# Patient Record
Sex: FEMALE | Race: WHITE | NOT HISPANIC OR LATINO | Employment: FULL TIME | URBAN - NONMETROPOLITAN AREA
[De-identification: names, ages, dates, MRNs, and addresses within clinical notes are randomized per-mention and may not be internally consistent; named-entity substitution may affect disease eponyms.]

---

## 2023-10-03 ENCOUNTER — OFFICE VISIT (OUTPATIENT)
Dept: CARDIOLOGY | Facility: CLINIC | Age: 33
End: 2023-10-03
Payer: COMMERCIAL

## 2023-10-03 VITALS
BODY MASS INDEX: 51.91 KG/M2 | DIASTOLIC BLOOD PRESSURE: 70 MMHG | HEART RATE: 74 BPM | WEIGHT: 293 LBS | HEIGHT: 63 IN | SYSTOLIC BLOOD PRESSURE: 126 MMHG

## 2023-10-03 DIAGNOSIS — E66.01 MORBID OBESITY WITH BMI OF 50.0-59.9, ADULT (MULTI): ICD-10-CM

## 2023-10-03 DIAGNOSIS — R00.2 PALPITATIONS: Primary | ICD-10-CM

## 2023-10-03 DIAGNOSIS — Z78.9 NEVER SMOKED TOBACCO: ICD-10-CM

## 2023-10-03 DIAGNOSIS — I10 PRIMARY HYPERTENSION: ICD-10-CM

## 2023-10-03 PROCEDURE — 3078F DIAST BP <80 MM HG: CPT | Performed by: INTERNAL MEDICINE

## 2023-10-03 PROCEDURE — 3074F SYST BP LT 130 MM HG: CPT | Performed by: INTERNAL MEDICINE

## 2023-10-03 PROCEDURE — 1036F TOBACCO NON-USER: CPT | Performed by: INTERNAL MEDICINE

## 2023-10-03 PROCEDURE — 93000 ELECTROCARDIOGRAM COMPLETE: CPT | Performed by: INTERNAL MEDICINE

## 2023-10-03 PROCEDURE — 99204 OFFICE O/P NEW MOD 45 MIN: CPT | Performed by: INTERNAL MEDICINE

## 2023-10-03 RX ORDER — LEVONORGESTREL 52 MG/1
INTRAUTERINE DEVICE INTRAUTERINE
COMMUNITY

## 2023-10-03 RX ORDER — BUPROPION HYDROCHLORIDE 150 MG/1
150 TABLET ORAL EVERY MORNING
COMMUNITY

## 2023-10-03 RX ORDER — EPINEPHRINE 0.1 MG/ML
INJECTION INTRAVENOUS ONCE
COMMUNITY

## 2023-10-03 ASSESSMENT — PATIENT HEALTH QUESTIONNAIRE - PHQ9
SUM OF ALL RESPONSES TO PHQ9 QUESTIONS 1 AND 2: 0
1. LITTLE INTEREST OR PLEASURE IN DOING THINGS: NOT AT ALL
2. FEELING DOWN, DEPRESSED OR HOPELESS: NOT AT ALL

## 2023-10-03 NOTE — PROGRESS NOTES
"Subjective   Tiffany Perez is a 33 y.o. female.    Chief Complaint:  Palpitations (New Patient-referred for heart palps)    Patient is seen for the above.  She describes intermittent sometimes infrequent episodes of palpitation typically at rest.  Differential diagnosis includes PACs, PVCs, atrial fibrillation, atrial flutter or noncardiac palpitation.  She denies orthopnea PND but has some subjective dyspnea with exertion which may be in part due to deconditioning and body habitus.    She also has some leg edema that other caregivers have been \"concerned about\".  I believe is probably lymphedema.    Family history is unremarkable.  Siblings have no history of heart failure or premature sudden cardiac death.  The mother is  with a vague history of uncertain significance.  Father is alive and well.    Palpitations         Review of Systems   Cardiovascular:  Positive for palpitations.       Objective   Constitutional:       Appearance: Healthy appearance. Not in distress.   Neck:      Vascular: No carotid bruit or JVR. JVD normal.   Pulmonary:      Effort: Pulmonary effort is normal.      Breath sounds: Normal breath sounds. No wheezing. No rhonchi. No rales.   Chest:      Chest wall: Not tender to palpatation.   Cardiovascular:      PMI at left midclavicular line. Normal rate. Regular rhythm. Normal S1. Normal S2.       Murmurs: There is no murmur.      No gallop.  No click. No rub.   Pulses:     Intact distal pulses.   Edema:     Peripheral edema absent.   Abdominal:      General: Bowel sounds are normal.      Palpations: Abdomen is soft.      Tenderness: There is no abdominal tenderness.   Musculoskeletal: Normal range of motion.         General: No tenderness. Skin:     General: Skin is warm and dry.   Neurological:      General: No focal deficit present.      Mental Status: Alert and oriented to person, place and time.         Lab Review:   not applicable    Assessment/Plan   1. Palpitations  " Transthoracic Echo (TTE) Complete    Holter Or Event Cardiac Monitor    Follow Up In Cardiology      2. Primary hypertension  Transthoracic Echo (TTE) Complete    Follow Up In Cardiology      3. Morbid obesity with BMI of 50.0-59.9, adult (CMS/HCC)        4. Never smoked tobacco

## 2023-10-03 NOTE — PATIENT INSTRUCTIONS
Please bring all medicines, vitamins, and herbal supplements with you when you come to the office.    Prescriptions will not be filled unless you are compliant with your follow up appointments or have a follow up appointment scheduled as per instruction of your physician. Refills should be requested at the time of your visit.

## 2023-11-10 PROBLEM — F33.9 RECURRENT MAJOR DEPRESSIVE DISORDER (CMS-HCC): Status: ACTIVE | Noted: 2023-06-12

## 2023-11-10 PROBLEM — F41.1 GENERALIZED ANXIETY DISORDER: Status: ACTIVE | Noted: 2023-06-12

## 2023-11-10 PROBLEM — E66.01 MORBID OBESITY (MULTI): Status: ACTIVE | Noted: 2023-06-12

## 2023-11-10 PROBLEM — F43.21 ADJUSTMENT DISORDER WITH DEPRESSED MOOD: Status: ACTIVE | Noted: 2023-06-12

## 2023-11-10 PROBLEM — F41.0 PANIC DISORDER: Status: ACTIVE | Noted: 2023-06-12

## 2023-11-10 PROBLEM — M94.20 CHONDROMALACIA: Status: ACTIVE | Noted: 2023-06-12

## 2023-11-10 PROBLEM — E78.6 LOW HDL (UNDER 40): Status: ACTIVE | Noted: 2023-06-12

## 2023-11-10 RX ORDER — LISINOPRIL AND HYDROCHLOROTHIAZIDE 10; 12.5 MG/1; MG/1
1 TABLET ORAL EVERY MORNING
COMMUNITY

## 2023-11-13 ENCOUNTER — ANCILLARY PROCEDURE (OUTPATIENT)
Dept: CARDIOLOGY | Facility: CLINIC | Age: 33
End: 2023-11-13
Payer: COMMERCIAL

## 2023-11-13 ENCOUNTER — HOSPITAL ENCOUNTER (OUTPATIENT)
Dept: CARDIOLOGY | Facility: CLINIC | Age: 33
Discharge: HOME | End: 2023-11-13
Payer: COMMERCIAL

## 2023-11-13 VITALS
HEIGHT: 63 IN | WEIGHT: 293 LBS | SYSTOLIC BLOOD PRESSURE: 126 MMHG | BODY MASS INDEX: 51.91 KG/M2 | DIASTOLIC BLOOD PRESSURE: 78 MMHG

## 2023-11-13 DIAGNOSIS — R00.2 PALPITATIONS: ICD-10-CM

## 2023-11-13 DIAGNOSIS — I10 PRIMARY HYPERTENSION: ICD-10-CM

## 2023-11-13 PROBLEM — E66.01 MORBID OBESITY (MULTI): Status: RESOLVED | Noted: 2023-06-12 | Resolved: 2023-11-13

## 2023-11-13 PROCEDURE — 93272 ECG/REVIEW INTERPRET ONLY: CPT | Performed by: INTERNAL MEDICINE

## 2023-11-13 PROCEDURE — 93306 TTE W/DOPPLER COMPLETE: CPT

## 2023-11-13 PROCEDURE — 93306 TTE W/DOPPLER COMPLETE: CPT | Performed by: INTERNAL MEDICINE

## 2023-11-13 PROCEDURE — 2500000004 HC RX 250 GENERAL PHARMACY W/ HCPCS (ALT 636 FOR OP/ED): Performed by: INTERNAL MEDICINE

## 2023-11-13 RX ADMIN — HUMAN ALBUMIN MICROSPHERES AND PERFLUTREN 0.5 ML: 10; .22 INJECTION, SOLUTION INTRAVENOUS at 10:32

## 2023-11-14 LAB
AORTIC VALVE MEAN GRADIENT: 4
AORTIC VALVE PEAK VELOCITY: 1.47
AV PEAK GRADIENT: 8.6
AVA (PEAK VEL): 2.2
AVA (VTI): 2.42
EJECTION FRACTION APICAL 4 CHAMBER: 61.8
LEFT VENTRICLE INTERNAL DIMENSION DIASTOLE: 4.4 (ref 3.5–6)
LEFT VENTRICULAR OUTFLOW TRACT DIAMETER: 2
MITRAL VALVE E/A RATIO: 1.29
MITRAL VALVE E/E' RATIO: 9.1

## 2023-12-21 LAB — BODY SURFACE AREA: 2.59 M2

## 2024-01-09 ENCOUNTER — OFFICE VISIT (OUTPATIENT)
Dept: CARDIOLOGY | Facility: CLINIC | Age: 34
End: 2024-01-09
Payer: COMMERCIAL

## 2024-01-09 VITALS
DIASTOLIC BLOOD PRESSURE: 80 MMHG | BODY MASS INDEX: 51.91 KG/M2 | HEIGHT: 63 IN | SYSTOLIC BLOOD PRESSURE: 136 MMHG | HEART RATE: 72 BPM | WEIGHT: 293 LBS

## 2024-01-09 DIAGNOSIS — I10 PRIMARY HYPERTENSION: ICD-10-CM

## 2024-01-09 DIAGNOSIS — R00.2 PALPITATIONS: ICD-10-CM

## 2024-01-09 PROCEDURE — 3075F SYST BP GE 130 - 139MM HG: CPT | Performed by: INTERNAL MEDICINE

## 2024-01-09 PROCEDURE — 3008F BODY MASS INDEX DOCD: CPT | Performed by: INTERNAL MEDICINE

## 2024-01-09 PROCEDURE — 1036F TOBACCO NON-USER: CPT | Performed by: INTERNAL MEDICINE

## 2024-01-09 PROCEDURE — 3079F DIAST BP 80-89 MM HG: CPT | Performed by: INTERNAL MEDICINE

## 2024-01-09 PROCEDURE — 99213 OFFICE O/P EST LOW 20 MIN: CPT | Performed by: INTERNAL MEDICINE

## 2024-01-09 NOTE — PATIENT INSTRUCTIONS
Please bring all medicines, vitamins, and herbal supplements with you when you come to the office.    Prescriptions will not be filled unless you are compliant with your follow up appointments or have a follow up appointment scheduled as per instruction of your physician. Refills should be requested at the time of your visit.   Follow up as needed.

## 2024-01-09 NOTE — PROGRESS NOTES
"Subjective   Tiffany Perez is a 33 y.o. female       Chief Complaint    Follow-up          HPI     Patient returns in follow-up of palpitation.  Her evaluation included a Holter monitor and also echocardiogram.  Both proved to be normal.  She was provided reassurance and I advised her that I suspect most of her symptoms are on the basis of muscle skeletal spasm and/or esophageal spasm because she had multiple \"symptomatic\" events on Holter monitoring but there were no corresponding arrhythmias.  Because of this we suggest that she pursue the matter with primary care.    We advised her and educated her extensively as to the reason why we believe her prognosis is good.  She understands and concurs and henceforth will see her only as needed.    Review of Systems   All other systems reviewed and are negative.         Visit Vitals  /80 (BP Location: Left arm, Patient Position: Sitting)   Pulse 72   Ht 1.6 m (5' 3\")   Wt 148 kg (327 lb)   BMI 57.93 kg/m²   Smoking Status Never   BSA 2.56 m²        Objective   Physical Exam  Constitutional:       Appearance: Normal appearance. She is normal weight.   HENT:      Nose: Nose normal.   Neck:      Vascular: No carotid bruit.   Cardiovascular:      Rate and Rhythm: Normal rate.      Pulses: Normal pulses.      Heart sounds: Normal heart sounds.   Pulmonary:      Effort: Pulmonary effort is normal.   Abdominal:      General: Bowel sounds are normal.      Palpations: Abdomen is soft.   Genitourinary:     Rectum: Normal.   Musculoskeletal:         General: Normal range of motion.      Cervical back: Normal range of motion.      Right lower leg: No edema.      Left lower leg: No edema.   Skin:     General: Skin is warm and dry.   Neurological:      General: No focal deficit present.      Mental Status: She is alert.   Psychiatric:         Mood and Affect: Mood normal.         Behavior: Behavior normal.         Thought Content: Thought content normal.         Judgment: " Judgment normal.         Current Medications    Current Outpatient Medications:     buPROPion XL (Wellbutrin XL) 150 mg 24 hr tablet, Take 1 tablet (150 mg) by mouth once daily in the morning., Disp: , Rfl:     EPINEPHrine 1 mg/10mL syringe, Infuse into a venous catheter 1 time., Disp: , Rfl:     levonorgestreL (Liletta) 20.4 mcg/24 hrs (8 yrs) 52 mg intrauterine device, , Disp: , Rfl:     lisinopriL-hydrochlorothiazide 10-12.5 mg tablet, Take 1 tablet by mouth once daily in the morning., Disp: , Rfl:                      Assessment/Plan     1. Palpitations  I believe her palpitations to be actually muscle skeletal spasm or esophageal spasm.  Not arrhythmia.  She understands the explanation.      - Follow Up In Cardiology    2. Primary hypertension  Review treatment strategy demonstrates good control and no need for adjustment.      - Follow Up In Cardiology

## 2024-01-09 NOTE — LETTER
"January 9, 2024     Yuly Blackwell MD  [address] [facility] [address]    Patient: Tiffany Perez   YOB: 1990   Date of Visit: 1/9/2024       Dear Dr. Yuly Blackwell MD:    Thank you for referring Tiffany Perez to me for evaluation. Below are my notes for this consultation.  If you have questions, please do not hesitate to call me. I look forward to following your patient along with you.       Sincerely,     Riky Kitchen MD      CC: No Recipients  ______________________________________________________________________________________    Subjective   Tiffany Perez is a 33 y.o. female       Chief Complaint    Follow-up          HPI     Patient returns in follow-up of palpitation.  Her evaluation included a Holter monitor and also echocardiogram.  Both proved to be normal.  She was provided reassurance and I advised her that I suspect most of her symptoms are on the basis of muscle skeletal spasm and/or esophageal spasm because she had multiple \"symptomatic\" events on Holter monitoring but there were no corresponding arrhythmias.  Because of this we suggest that she pursue the matter with primary care.    We advised her and educated her extensively as to the reason why we believe her prognosis is good.  She understands and concurs and henceforth will see her only as needed.    Review of Systems   All other systems reviewed and are negative.         Visit Vitals  /80 (BP Location: Left arm, Patient Position: Sitting)   Pulse 72   Ht 1.6 m (5' 3\")   Wt 148 kg (327 lb)   BMI 57.93 kg/m²   Smoking Status Never   BSA 2.56 m²        Objective   Physical Exam  Constitutional:       Appearance: Normal appearance. She is normal weight.   HENT:      Nose: Nose normal.   Neck:      Vascular: No carotid bruit.   Cardiovascular:      Rate and Rhythm: Normal rate.      Pulses: Normal pulses.      Heart sounds: Normal heart sounds.   Pulmonary:      Effort: Pulmonary effort is normal. "   Abdominal:      General: Bowel sounds are normal.      Palpations: Abdomen is soft.   Genitourinary:     Rectum: Normal.   Musculoskeletal:         General: Normal range of motion.      Cervical back: Normal range of motion.      Right lower leg: No edema.      Left lower leg: No edema.   Skin:     General: Skin is warm and dry.   Neurological:      General: No focal deficit present.      Mental Status: She is alert.   Psychiatric:         Mood and Affect: Mood normal.         Behavior: Behavior normal.         Thought Content: Thought content normal.         Judgment: Judgment normal.         Current Medications    Current Outpatient Medications:   •  buPROPion XL (Wellbutrin XL) 150 mg 24 hr tablet, Take 1 tablet (150 mg) by mouth once daily in the morning., Disp: , Rfl:   •  EPINEPHrine 1 mg/10mL syringe, Infuse into a venous catheter 1 time., Disp: , Rfl:   •  levonorgestreL (Liletta) 20.4 mcg/24 hrs (8 yrs) 52 mg intrauterine device, , Disp: , Rfl:   •  lisinopriL-hydrochlorothiazide 10-12.5 mg tablet, Take 1 tablet by mouth once daily in the morning., Disp: , Rfl:                      Assessment/Plan     1. Palpitations  I believe her palpitations to be actually muscle skeletal spasm or esophageal spasm.  Not arrhythmia.  She understands the explanation.      - Follow Up In Cardiology    2. Primary hypertension  Review treatment strategy demonstrates good control and no need for adjustment.      - Follow Up In Cardiology